# Patient Record
Sex: MALE | Race: WHITE | NOT HISPANIC OR LATINO
[De-identification: names, ages, dates, MRNs, and addresses within clinical notes are randomized per-mention and may not be internally consistent; named-entity substitution may affect disease eponyms.]

---

## 2021-04-19 ENCOUNTER — APPOINTMENT (OUTPATIENT)
Dept: PULMONOLOGY | Facility: HOSPITAL | Age: 46
End: 2021-04-19
Payer: COMMERCIAL

## 2021-04-19 VITALS — WEIGHT: 192 LBS | HEIGHT: 70 IN | BODY MASS INDEX: 27.49 KG/M2

## 2021-04-19 DIAGNOSIS — Z78.9 OTHER SPECIFIED HEALTH STATUS: ICD-10-CM

## 2021-04-19 PROBLEM — Z00.00 ENCOUNTER FOR PREVENTIVE HEALTH EXAMINATION: Status: ACTIVE | Noted: 2021-04-19

## 2021-04-19 PROCEDURE — 99203 OFFICE O/P NEW LOW 30 MIN: CPT | Mod: 95

## 2021-04-19 NOTE — ASSESSMENT
[FreeTextEntry1] : 46-year-old man with symptoms suggestive of obstructive sleep apnea.  We will do a home study to assess the severity of sleep apnea.\par \par Patient is interested in inspire, but I explained to him today that patients usually have to try CPAP first and see if he can get used to it and inspire is indicated in CPAP failure patients.

## 2021-04-19 NOTE — HISTORY OF PRESENT ILLNESS
[FreeTextEntry1] : Dr. Rahman\par 46 year old man with no medical history  is here in the sleep center to address excessive snoring and restless sleep.  Patient is sleepy with Banks sleepiness score of 14.  Patient has very loud snoring which disturbs his girlfriend, Patient has witnessed apneas.  \par He tried zppa for couple years and is not working anymore. Patient's bedtime is around 11.30 PM wakes up in the morning around 6 AM.  He feels tired when he wakes up.  Patient drinks 1 cup of coffee. Patient does not have any headaches or nocturia.  He is not sleepy while driving.\par STOPBANG score - 4\par neck size - 17.5 inches\par He gained weight recently.

## 2022-03-01 ENCOUNTER — APPOINTMENT (OUTPATIENT)
Dept: PULMONOLOGY | Facility: CLINIC | Age: 47
End: 2022-03-01
Payer: COMMERCIAL

## 2022-03-01 VITALS
HEART RATE: 65 BPM | BODY MASS INDEX: 27.49 KG/M2 | WEIGHT: 192 LBS | HEIGHT: 70 IN | SYSTOLIC BLOOD PRESSURE: 130 MMHG | DIASTOLIC BLOOD PRESSURE: 80 MMHG

## 2022-03-01 DIAGNOSIS — G47.33 OBSTRUCTIVE SLEEP APNEA (ADULT) (PEDIATRIC): ICD-10-CM

## 2022-03-01 PROCEDURE — 99213 OFFICE O/P EST LOW 20 MIN: CPT

## 2022-03-01 NOTE — PHYSICAL EXAM
[General Appearance - Well Developed] : well developed [General Appearance - Well Nourished] : well nourished [IV] : IV [Heart Sounds] : normal S1 and S2 [Murmurs] : no murmurs [] : no respiratory distress [Auscultation Breath Sounds / Voice Sounds] : lungs were clear to auscultation bilaterally [Oriented To Time, Place, And Person] : oriented to person, place, and time [Memory Recent] : recent memory was not impaired [FreeTextEntry1] : no edema

## 2022-03-01 NOTE — ASSESSMENT
[FreeTextEntry1] : 46-year-old man with obstructive sleep apnea.  \par \par Patient is currently on CPAP, but patient has a sleepwalking so sometimes he pulls machine cable when he gets up and walks.  Patient is not able to use the CPAP every night due to discomfort from the machine.\par \par Today we talked about alternative choices as patient does not want to use a CPAP moving forward.  We will do another sleep study to assess the current degree of sleep apnea, sometimes home study underestimates the degree of apnea so we will ask for a in lab sleep study to get the current degree of sleep apnea.\par \par Discussed the procedure and next steps for inspire therapy.  We talked about AHI requirements and the BMI requirements for inspire therapy.  We also talked about drug-induced endoscopy and the procedure details.  Explained to him about short-term side effects from the inspire along with tongue dysfunction and swallowing issues.  And explained to Kam most of the time these issues will resolve with time as the inflammation decreases.  We also talked about long-term complications of the inspire implant.  Patient understood all above and expressed understanding.\par \par He will reach out in a week after he gets his new insurance to order sleep study.\par

## 2022-03-01 NOTE — HISTORY OF PRESENT ILLNESS
[FreeTextEntry1] : Dr. Rahman\par 46 year old man with no medical history  is here in the sleep center to address sleep apnea.  Patient is sleepy with [place]de sleepiness score of 14.  Patient has very loud snoring which disturbs his girlfriend, Patient has witnessed apneas.  \par He tried zppa for couple years and is not working anymore. Patient's bedtime is around 11.30 PM wakes up in the morning around 6 AM.  He feels tired when he wakes up.  \par \par neck size - 17.5 inches\par \par Patient had mild obstructive sleep apnea on a home study, home studies are usually not accurate.  Given significant symptoms patient went on CPAP.  Patient has some sleepwalking so it makes it difficult for patient to use the CPAP therapy.  When he uses it the snoring has improved but the sleep quality remain the same, he still is tired during the day.